# Patient Record
(demographics unavailable — no encounter records)

---

## 2024-10-21 NOTE — HISTORY OF PRESENT ILLNESS
[FreeTextEntry1] : Language: English Accompanied by: Self Contact info: [phone], cell: [phone] Referring Urologist: Anne PMD: Dr. Sacha Benjamin   Initial H&P 05/17/2024: Mr. MACK is a very pleasant 65 year-old gentleman who presents today for initial evaluation of Meatal Stenosis.   RALP 7y ago ([facility]).  Was told he has a "small urethra" back then.  Stenosis started around the time of the RALP.  Was dilated sequentially back then.   Does not feel specific urinary symptoms but had 3 UTIs since January.  Was told to perform CIC to keep the urethra open.  Has trouble pushing the catheter in.  S/p IPP 2021.  Also has BCR currently, on Lupron.    Occ "restriction" at the tip with voids, but mostly asymptomatic, denies spraying.  --------------------------------------------------------------------------------------------------------------------------------------- 05/28/24: s/p Urethromeatoplasty Findings: Scarred meatus and the very distal fossa navicularis was slightly scarred as well with more scar tissue noted than initially noted on physical exam in the office.  Scar tissue was able to be excised and normal urethral  mucosa was able to be advanced to the skin edge.  A 16-Telugu cystoscope was easily passed through the meatus and the fossa for cystoscopy.  More proximally, in the bulbar urethra, there were signs of prior narrowing that had been previously dilated, with no actual narrowing visible at this time.   --------------------------------------------------------------------------------------------------------------------------------------- Interval History 10/21/24: Presents today for f/u. Last seen in the office 07/29/2024. He is now ~5 months post op from procedure above.    Today, he states that everything is going well and his voiding pattern is normal with no additional complaints.  No recent UTIs and flow is stable.  He is now back on Farxiga and still has not had any UTIs (was taken off previously due to the UTIs).   --------------------------------------------------------------------------------------------------------------------------------------- PMHx: T2DM, Prostate Cancer, HTN, HLD, Gout PSHx: RALP, IPP, UHR, R Ulnar nerve SHx: kaiden wolf, works in the [employer]  All: PCN (unsure of reaction) --------------------------------------------------------------------------------------------------------------------------------------- Physical Exam: General: NAD, sitting on exam table comfortably HEENT: NCAT, EOMI Resp: breathing comfortably on RA, b/l symm chest rise Cardiac: RRR Abd: SNTND Back: (-) CVAT b/l MSK: PANCHO w/ FROM Psych: appropriate affect : normal appearing uncircumcised phallus, significantly stenotic meatus, penile prosthesis in place and deflated with no palpable abnormalities, pump within scrotum and nontender, b/l desc testes, no testicular masses or lesions, b/l vas palpable   7/29/24: meatus patent and appears well-healed, attempted to visualize fossa on external exam, however cylinders made it difficult to do so, therefore distal penile urethra was probed with a 14Fr straight cath - was inserted very easily with no resistance suggestive of stricture   10/21/24: meatus patent and appears well-healed --------------------------------------------------------------------------------------------------------------------------------------- Results:   --------------------------------------------------------------------------------------------------------------------------------------- A/P: 65M with meatal stenosis requiring occasional self-dilation with catheter, difficulty performing CIC, and 3 UTIs over 6 months, now ~5 months post op from urethromeatoplasty.   1. Meatal Stenosis We had a long discussion today about the findings on physical exam and his urinary symptoms (or lack thereof).   I am very pleased with the progress he has made thus far.  I recommended he continue to monitor for any changes in his urinary pattern.   We discussed period surveillance cystos for the more proximal area that appeared to have been previously dilated, as stricture recurrence may often precede any urinary symptoms.  At this time, we both agreed to monitor for symptoms, and hold off on cystoscopy.    I have answered all of his questions, and I will see him for f/u in 1y or sooner PRN.   Plan: - RTC 1y or sooner PRN - poss cysto on f/u pending discussion  I spent a total of 21 minutes on face-to-face counseling, coordination of care, review of prior records and clinical documentation.